# Patient Record
Sex: MALE | Race: WHITE
[De-identification: names, ages, dates, MRNs, and addresses within clinical notes are randomized per-mention and may not be internally consistent; named-entity substitution may affect disease eponyms.]

---

## 2018-01-23 ENCOUNTER — HOSPITAL ENCOUNTER (OUTPATIENT)
Dept: HOSPITAL 75 - PREOP | Age: 73
End: 2018-01-23
Attending: UROLOGY
Payer: MEDICARE

## 2018-01-23 VITALS — WEIGHT: 190 LBS | BODY MASS INDEX: 32.44 KG/M2 | HEIGHT: 64 IN

## 2018-01-23 DIAGNOSIS — N20.1: ICD-10-CM

## 2018-01-23 DIAGNOSIS — Z01.818: Primary | ICD-10-CM

## 2018-01-24 ENCOUNTER — HOSPITAL ENCOUNTER (OUTPATIENT)
Dept: HOSPITAL 75 - SDC | Age: 73
Discharge: HOME | End: 2018-01-24
Attending: UROLOGY
Payer: MEDICARE

## 2018-01-24 VITALS — DIASTOLIC BLOOD PRESSURE: 84 MMHG | SYSTOLIC BLOOD PRESSURE: 158 MMHG

## 2018-01-24 VITALS — BODY MASS INDEX: 32.44 KG/M2 | HEIGHT: 64 IN | WEIGHT: 190 LBS

## 2018-01-24 VITALS — SYSTOLIC BLOOD PRESSURE: 160 MMHG | DIASTOLIC BLOOD PRESSURE: 86 MMHG

## 2018-01-24 VITALS — SYSTOLIC BLOOD PRESSURE: 158 MMHG | DIASTOLIC BLOOD PRESSURE: 86 MMHG

## 2018-01-24 VITALS — SYSTOLIC BLOOD PRESSURE: 158 MMHG | DIASTOLIC BLOOD PRESSURE: 89 MMHG

## 2018-01-24 DIAGNOSIS — I25.10: ICD-10-CM

## 2018-01-24 DIAGNOSIS — Z79.82: ICD-10-CM

## 2018-01-24 DIAGNOSIS — Z11.2: ICD-10-CM

## 2018-01-24 DIAGNOSIS — N20.1: Primary | ICD-10-CM

## 2018-01-24 DIAGNOSIS — Z79.899: ICD-10-CM

## 2018-01-24 DIAGNOSIS — E78.5: ICD-10-CM

## 2018-01-24 DIAGNOSIS — Z95.1: ICD-10-CM

## 2018-01-24 PROCEDURE — 74018 RADEX ABDOMEN 1 VIEW: CPT

## 2018-01-24 PROCEDURE — 87081 CULTURE SCREEN ONLY: CPT

## 2018-01-24 NOTE — DISCHARGE INST-UROLOGY
Discharge Inst-Urology


Discharge Medications


New, Converted, or Re-newed RX:  RX on Chart





Patient Instructions/Follow Up


Plan


Please make appointment to been seen in office in 3 weeks.





Increase oral fluids for 48 hours and then as needed.





Diet and Activity as tolerated.





If questions or concerns contact your physician 


Or seek help at emergency department.











TAWIL,ELIAS A MD Jan 24, 2018 7:10 am

## 2018-01-24 NOTE — PROGRESS NOTE-POST OPERATIVE
Post-Operative Progess Note


Surgeon (s)/Assistant (s)


Surgeon


ELIAS TAWIL MD


Assistant:  N/A





Pre-Operative Diagnosis


LT URETERAL STONE





Post-Operative Diagnosis





SAME





Procedure & Operative Findings


Date of Procedure


1/24/18


Procedure Performed/Findings


CYSTOSCOPY, LT URETEROSCOPY WITH STONE BASKET


Anesthesia Type


GENERAL





Estimated Blood Loss


Estimated blood loss (mL):  N/A





Specimens/Packing


Specimens Removed


N/A


Packing:  


N/A











TAWIL,ELIAS A MD Jan 24, 2018 7:08 am

## 2018-01-24 NOTE — PROGRESS NOTE-PRE OPERATIVE
Pre-Operative Progress Note


H&P Reviewed


The H&P was reviewed, patient examined and no changes noted.


Date Seen by Provider:  Jan 24, 2018


Time Seen by Provider:  07:08


Date H&P Reviewed:  Jan 24, 2018


Time H&P Reviewed:  07:08


Pre-Operative Diagnosis:  LT URETERAL STONE











TAWIL,ELIAS A MD Jan 24, 2018 7:08 am

## 2018-01-24 NOTE — OPERATIVE REPORT
DATE OF SERVICE:  01/24/2018



PREOPERATIVE DIAGNOSIS:

Left distal ureteral stone.



POSTOPERATIVE DIAGNOSIS:

Left distal ureteral stone.



OPERATION PERFORMED:

Cystoscopy with left ureteroscopy and stone basket and retrograde urogram.



SURGEON:

Elias Tawil, MD



ANESTHESIA:

General.



COMPLICATIONS:

None.



DESCRIPTION OF PROCEDURE:

Under satisfactory general anesthesia, the patient in lithotomy position,

genitalia were prepped and draped in usual sterile fashion.  Cystoscope was

introduced under vision.  The anterior urethra was normal.  The prostate was

mildly enlarged with some bladder neck obstruction.  The bladder was entered and

revealed mild trabeculations.  Ureteric orifices normal in shape, size and

configuration with clear efflux, more sluggish on the left side.  Using the

foroblique lens, I dilated the left ureter out of the intramural portion to

accommodate a 6.9 [place] semi-rigid ureteroscope.  I was unable to manipulate a

curve of the ureter; however, I was able to pass a 3-[facility] [place]ian _____ basket above

the stone, engaged it, and felt that it was breaking up and I showed fragments

coming out of the ureter and the basket was removed.  I went ahead and performed

a retrograde urogram through the ureteroscope.  There was no filling defect. 

Then, complete immediate emptying of the ureter.  I removed the ureteroscope,

reinserted the cystoscope to empty the bladder.  The patient tolerated the

procedure and anesthesia well and was sent to recovery room in stable condition.





Job ID: [id number]

DocumentID: [id number]

Dictated Date:  01/24/2018 09:13:48

Transcription Date: 01/24/2018 11:54:59

Dictated By: ELIAS TAWIL, MD

## 2018-01-24 NOTE — DIAGNOSTIC IMAGING REPORT
CLINICAL INDICATION: Left ureteral stone. Pre-cystoscopy.



EXAMINATION: KUB x-ray.



COMPARISON: None.



FINDINGS:

There are two calcifications in the left low pelvis region which

may represent phleboliths. There is a 1.5 cm x 0.7 cm area of

increased density overlying the left T11 rib which may represent

a bone island or calcification involving left kidney. Correlation

with CT scans would better evaluate. There are focal

calcifications of the right pelvis suspect to represent

phleboliths.



There is a nonobstructed bowel gas pattern.  There is no evidence

of abdominal free air.  



There are hypertrophic spurs involving the visualized lower

thoracic and lumbar spine.



IMPRESSION:

1: Possible 1.5 cm bone island involving the left T11 rib versus

calcification involving the left kidney. This should be better

correlated with previous CT scans.



2: Suspected phleboliths seen in the pelvis.



Dictated by: 



  Dictated on workstation # [id number]

## 2018-05-29 ENCOUNTER — HOSPITAL ENCOUNTER (OUTPATIENT)
Dept: HOSPITAL 75 - CARD | Age: 73
End: 2018-05-29
Attending: INTERNAL MEDICINE
Payer: MEDICARE

## 2018-05-29 VITALS — DIASTOLIC BLOOD PRESSURE: 84 MMHG | SYSTOLIC BLOOD PRESSURE: 166 MMHG

## 2018-05-29 VITALS — SYSTOLIC BLOOD PRESSURE: 155 MMHG | DIASTOLIC BLOOD PRESSURE: 88 MMHG

## 2018-05-29 DIAGNOSIS — E78.5: ICD-10-CM

## 2018-05-29 DIAGNOSIS — I25.10: Primary | ICD-10-CM

## 2018-05-29 DIAGNOSIS — E66.9: ICD-10-CM

## 2018-05-29 PROCEDURE — 78452 HT MUSCLE IMAGE SPECT MULT: CPT

## 2018-05-29 PROCEDURE — 93017 CV STRESS TEST TRACING ONLY: CPT

## 2018-05-29 NOTE — STRESS TEST
DATE OF SERVICE:  05/29/2018



RESTING AND POST REGADENOSON TECHNETIUM-99M TETROFOSMIN SPECT CT IMAGING



ORDERING PHYSICIAN:

Dr. Page.



CLINICAL DIAGNOSES:

Coronary artery disease.



Baseline images were carried out after injection of 10.54 mCi technetium-99m

Tetrofosmin.  This was followed by 0.4 mg regadenoson and 32.5 mCi

technetium-99m Tetrofosmin.  The electrocardiogram showed sinus rhythm at

baseline.  The electrocardiogram did not change significantly with the

regadenoson infusion.  The patient noted some shortness of breath following

regadenoson infusion, which resolved in a few minutes.  Review of images at rest

and following stress does not indicate any significant perfusion defects

consistent with significant myocardial ischemia or infarction.  Gated images

show normal global left ventricular systolic function with normal regional wall

motion.  Left ventricular ejection fraction calculated to be 55%.  Left

ventricular end diastolic volume is 68 mL.



CONCLUSIONS:

1.  No evidence of any significant myocardial ischemia or infarction is seen.

2.  Normal regional wall motion.

3.  Normal global left ventricular systolic function with a calculated ejection

fraction of 55%.





Job ID: [id number]

DocumentID: [id number]

Dictated Date:  05/29/2018 16:27:51

Transcription Date: 05/29/2018 17:29:49

Dictated By: TRISTEN PAGE MD, MA, FACP, FACC,

MTDD

## 2020-06-08 ENCOUNTER — HOSPITAL ENCOUNTER (OUTPATIENT)
Dept: HOSPITAL 75 - RAD FS | Age: 75
End: 2020-06-08
Attending: NURSE PRACTITIONER
Payer: MEDICARE

## 2020-06-08 DIAGNOSIS — M25.511: Primary | ICD-10-CM

## 2020-06-08 PROCEDURE — 73030 X-RAY EXAM OF SHOULDER: CPT

## 2020-06-08 NOTE — DIAGNOSTIC IMAGING REPORT
INDICATION: Right shoulder pain 



COMPARISON: None.



FINDINGS: 3 views of the right shoulder were obtained. There is

no fracture, dislocation, or other acute bony abnormality

identified. There is mild narrowing of the acromiohumeral joint

space. The soft tissues appear unremarkable. No radiopaque

foreign bodies identified. The visualized portions of the right

lung are clear.



IMPRESSION:

1. No acute fractures or dislocations of the right shoulder. 

2. Mildly narrowed appearance to the acromiohumeral joint space.

Findings can be seen with chronic rotator cuff tear.



Dictated by: 



  Dictated on workstation # [id number]

## 2021-04-28 ENCOUNTER — HOSPITAL ENCOUNTER (OUTPATIENT)
Dept: HOSPITAL 75 - CARD | Age: 76
End: 2021-04-28
Attending: NURSE PRACTITIONER
Payer: MEDICARE

## 2021-04-28 DIAGNOSIS — I11.9: Primary | ICD-10-CM

## 2021-04-28 PROCEDURE — 93306 TTE W/DOPPLER COMPLETE: CPT

## 2021-04-30 ENCOUNTER — HOSPITAL ENCOUNTER (OUTPATIENT)
Dept: HOSPITAL 75 - CARD | Age: 76
End: 2021-04-30
Attending: NURSE PRACTITIONER
Payer: MEDICARE

## 2021-04-30 VITALS — WEIGHT: 182.98 LBS | HEIGHT: 62.99 IN | BODY MASS INDEX: 32.42 KG/M2

## 2021-04-30 VITALS — DIASTOLIC BLOOD PRESSURE: 90 MMHG | SYSTOLIC BLOOD PRESSURE: 168 MMHG

## 2021-04-30 DIAGNOSIS — R94.31: Primary | ICD-10-CM

## 2021-04-30 PROCEDURE — 93017 CV STRESS TEST TRACING ONLY: CPT

## 2021-04-30 PROCEDURE — 78452 HT MUSCLE IMAGE SPECT MULT: CPT

## 2021-05-03 NOTE — STRESS TEST
DATE OF SERVICE:  04/30/2021



RESTING AND POST REGADENOSON TECHNETIUM-99M TETROFOSMIN SPECT CT IMAGING



ORDERING PHYSICIAN:

MARCIA Patel



PRIMARY PHYSICIAN:

Dr. Wilson.



CLINICAL DIAGNOSIS:

Abnormal electrocardiogram.



Baseline images were carried out after injection of 10.57 mCi of technetium-99m

Tetrofosmin.  This was followed by 0.4 mg regadenoson and 32.5 mCi of

technetium-99m Tetrofosmin for stress imaging.  The electrocardiogram did not

change significantly.  The patient tolerated the procedure well.  Review of

images at rest and following stress does not indicate any distinct evidence of

ischemia.  Gated images show normal global left ventricular systolic function

with normal regional wall motion.  Left ventricular ejection fraction is

calculated to be 62%.  Left ventricular end diastolic volume is 78 mL.  TID is

absent (1.07).



CONCLUSIONS:

1.  No evidence of any significant myocardial ischemia or infarction.

2.  Normal regional wall motion.

3.  Normal global left ventricular systolic function with a calculated ejection

fraction of 62%.





Job ID: [id number]

DocumentID: [id number]

Dictated Date:  05/03/2021 10:15:20

Transcription Date: 05/03/2021 14:40:06

Dictated By: TRISTEN QUISPE MD, MA, FACP, FACC,

## 2023-07-25 ENCOUNTER — HOSPITAL ENCOUNTER (OUTPATIENT)
Dept: HOSPITAL 75 - PREOP | Age: 78
Discharge: HOME | End: 2023-07-25
Attending: SURGERY
Payer: MEDICARE

## 2023-07-25 VITALS — HEIGHT: 62.99 IN | WEIGHT: 177.69 LBS | BODY MASS INDEX: 31.48 KG/M2

## 2023-07-25 DIAGNOSIS — Z01.818: Primary | ICD-10-CM

## 2023-07-26 ENCOUNTER — HOSPITAL ENCOUNTER (OUTPATIENT)
Dept: HOSPITAL 75 - ENDO | Age: 78
Discharge: HOME | End: 2023-07-26
Attending: SURGERY
Payer: MEDICARE

## 2023-07-26 VITALS — SYSTOLIC BLOOD PRESSURE: 107 MMHG | DIASTOLIC BLOOD PRESSURE: 89 MMHG

## 2023-07-26 VITALS — SYSTOLIC BLOOD PRESSURE: 138 MMHG | DIASTOLIC BLOOD PRESSURE: 82 MMHG

## 2023-07-26 VITALS — HEIGHT: 62.99 IN | WEIGHT: 177.69 LBS | BODY MASS INDEX: 31.48 KG/M2

## 2023-07-26 VITALS — DIASTOLIC BLOOD PRESSURE: 71 MMHG | SYSTOLIC BLOOD PRESSURE: 116 MMHG

## 2023-07-26 VITALS — SYSTOLIC BLOOD PRESSURE: 172 MMHG | DIASTOLIC BLOOD PRESSURE: 82 MMHG

## 2023-07-26 VITALS — DIASTOLIC BLOOD PRESSURE: 82 MMHG | SYSTOLIC BLOOD PRESSURE: 138 MMHG

## 2023-07-26 DIAGNOSIS — K63.5: ICD-10-CM

## 2023-07-26 DIAGNOSIS — Z87.19: ICD-10-CM

## 2023-07-26 DIAGNOSIS — K64.1: ICD-10-CM

## 2023-07-26 DIAGNOSIS — K52.9: ICD-10-CM

## 2023-07-26 DIAGNOSIS — Z85.46: ICD-10-CM

## 2023-07-26 DIAGNOSIS — Z12.11: Primary | ICD-10-CM

## 2023-07-26 DIAGNOSIS — Z95.1: ICD-10-CM

## 2023-07-26 DIAGNOSIS — K64.4: ICD-10-CM

## 2023-07-26 DIAGNOSIS — K57.30: ICD-10-CM

## 2023-07-26 DIAGNOSIS — K63.89: ICD-10-CM

## 2023-07-26 NOTE — PROGRESS NOTE-PRE OPERATIVE
Pre-Operative Progress Note


Date of Available H&P:  Jul 26, 2023


Date H&P Reviewed:  Jul 26, 2023


Time H&P Reviewed:  13:00


History & Physical:  No changes noted


Pre-Operative Diagnosis:  screening











MADALYN SIFUENTES MD                Jul 26, 2023 13:42

## 2023-07-26 NOTE — DISCHARGE INST-SURGICAL
D/C Lap Instructions-KIDO


New, Converted, or Re-Newed RX:  RX on Chart


Follow Up





Activity as tolerated





High Fiber Diet 25g or more per day





Avoid Alcohol, Caffeine, Spicy Greasy and Acid foods.





Drink 64 fluid oz or more of fluids per day.





Symptoms to Report: Fever over 101 degree F, Nausea/Vomiting 


If any problems/questions: Contact your physician or go to Emergency Room











MADALYN SIFUENTES MD                Jul 26, 2023 13:45

## 2023-07-26 NOTE — ANESTHESIA-GENERAL POST-OP
MAC


Patient Condition


Mental Status/LOC:  Same as Preop


Cardiovascular:  Satisfactory


Nausea/Vomiting:  Absent


Respiratory:  Satisfactory


Pain:  Controlled


Complications:  Absent





Post Op Complications


Complications


None





Follow Up Care/Instructions


Patient Instructions


None needed.





Anesthesiology Discharge Order


Discharge Order


Patient is doing well, no complaints, stable vital signs, no apparent adverse 

anesthesia problems.   


No complications reported per nursing.











JAN DRAPER CRNA          Jul 26, 2023 15:50

## 2023-07-26 NOTE — PROGRESS NOTE-POST OPERATIVE
Post-Operative Progess Note


Surgeon (s)/Assistant (s)


Surgeon


MADALYN SIFUENTES MD


Assistant:  none





Pre-Operative Diagnosis


screening





Post-Operative Diagnosis





chronic stage 2 ext and int hemorrhoids, moderate sigmoid diverticulosis,


small HP polyps transverse colon.





Procedure & Operative Findings


Date of Procedure


7/26/23


Procedure Performed/Findings


colonoscopy with bx.


Anesthesia Type


mac





Estimated Blood Loss


Estimated blood loss (mL):  minimal





Specimens/Packing


Specimens Removed


transverse colon polyp











MADALYN SIFUENTES MD                Jul 26, 2023 16:27

## 2023-07-26 NOTE — OPERATIVE REPORT
DATE OF SERVICE: 07/26/2023



ATTENDING PRIMARY CARE PHYSICIAN:

Dr. Wilson.



PREOPERATIVE DIAGNOSIS:

Screening colonoscopy.



POSTOPERATIVE DIAGNOSES:

Chronic stage II external and internal hemorrhoids, moderate sigmoid 

diverticulosis, transverse colonic small finger-like polyps.



PROCEDURE:

Colonoscopy with biopsy.



SURGEON:

Madalyn Sifuentes MD



ANESTHESIA:

Monitored anesthesia care.



ESTIMATED BLOOD LOSS:

Minimal.



FINDINGS:

Chronic stage II external and internal hemorrhoids, moderate sigmoid 

diverticulosis, transverse colonic small finger-like polyps.



DISPOSITION:

The patient tolerated the procedure well.



INDICATIONS:

The patient is an 78-year-old male who was referred over to us for screening 

colonoscopy.  His last colonoscopy was approximately in 2010 and he believes 

this to be normal.  He does not report any major issues with diarrhea, nor 

constipation as well as no red blood per rectum, nor any dark tarry stools.  He 

also does not report any family history of colon cancer.  He does have a 

personal history of prostate cancer and did underwent a prostatectomy in 2013.



DESCRIPTION OF PROCEDURE:

The patient was brought to the endoscopy suite and laid in the left lateral 

decubitus position.  After adequate IV pain and sedative medications and 

monitored anesthesia care, a digital rectal examination was performed.  Chronic 

stage II external and internal hemorrhoids were identified, which were not 

actively edematous nor inflamed and no bleeding.  Normal sphincter tone was felt

and there were no palpable masses.



The endoscope was then intubated into the anus, rectum gently insufflated.  The 

endoscope was then advanced through the valves of Clark of the rectum with no 

polyps or any neoplasms identified.  Through the sigmoid colon, a moderate 

sigmoid diverticulosis identified.  The endoscope was then advanced to the 

remainder of the descending and transverse colon.  At the level of the 

transverse colon, there were multiple small frond or finger-like projections, 

which appeared to be small polyps.  A few of these were taken with biopsy 

forceps and electrocautery with visualization of good hemostasis.  The endoscope

was then advanced to the remainder of the descending, transverse and ascending 

colon to the cecum, which were normal.  No other lesions identified.  The 

endoscope was then slowly withdrawn while taking a second look and suctioning of

residual air with no additional findings.



The patient tolerated the procedure well.  We will recommend the necessary 

lifestyle and dietary accommodation including a high-fiber diet with 

incorporation of a fiber supplement, which should equal or exceed 30 grams daily

as well as significant amounts of water to promote soft consistency stools on a 

daily basis.  We will also have him follow up in 2 weeks to discuss the 

pathology results and if these are benign and do not have any malignancy 
potential or

adenomatous polyps without any component of villous adenoma, then he may wait 10

years for his next colonoscopy.





Job ID: [id number]

DocumentID: [id number]

Dictated Date: 07/26/2023 15:51:08

Transcription Date: 07/26/2023 19:59:00

Dictated By: MADALYN SIFUENTES MD

[facility]